# Patient Record
Sex: MALE | Race: WHITE | NOT HISPANIC OR LATINO | Employment: OTHER | URBAN - METROPOLITAN AREA
[De-identification: names, ages, dates, MRNs, and addresses within clinical notes are randomized per-mention and may not be internally consistent; named-entity substitution may affect disease eponyms.]

---

## 2024-01-08 ENCOUNTER — HOSPITAL ENCOUNTER (EMERGENCY)
Facility: CLINIC | Age: 69
Discharge: HOME | End: 2024-01-08
Attending: SOCIAL WORKER | Admitting: SOCIAL WORKER

## 2024-01-08 ENCOUNTER — APPOINTMENT (OUTPATIENT)
Dept: ULTRASOUND IMAGING | Facility: CLINIC | Age: 69
End: 2024-01-08
Attending: EMERGENCY MEDICINE

## 2024-01-08 VITALS
TEMPERATURE: 97.6 F | WEIGHT: 205 LBS | HEART RATE: 72 BPM | OXYGEN SATURATION: 99 % | SYSTOLIC BLOOD PRESSURE: 124 MMHG | DIASTOLIC BLOOD PRESSURE: 91 MMHG | HEIGHT: 73 IN | RESPIRATION RATE: 18 BRPM | BODY MASS INDEX: 27.17 KG/M2

## 2024-01-08 DIAGNOSIS — I82.412 ACUTE DEEP VEIN THROMBOSIS (DVT) OF FEMORAL VEIN OF LEFT LOWER EXTREMITY (H): ICD-10-CM

## 2024-01-08 LAB
ANION GAP SERPL CALCULATED.3IONS-SCNC: 10 MMOL/L (ref 7–15)
BASOPHILS # BLD AUTO: 0.1 10E3/UL (ref 0–0.2)
BASOPHILS NFR BLD AUTO: 1 %
BUN SERPL-MCNC: 20.7 MG/DL (ref 8–23)
CALCIUM SERPL-MCNC: 9.2 MG/DL (ref 8.8–10.2)
CHLORIDE SERPL-SCNC: 104 MMOL/L (ref 98–107)
CREAT SERPL-MCNC: 1.07 MG/DL (ref 0.67–1.17)
DEPRECATED HCO3 PLAS-SCNC: 27 MMOL/L (ref 22–29)
EGFRCR SERPLBLD CKD-EPI 2021: 75 ML/MIN/1.73M2
EOSINOPHIL # BLD AUTO: 0.2 10E3/UL (ref 0–0.7)
EOSINOPHIL NFR BLD AUTO: 3 %
ERYTHROCYTE [DISTWIDTH] IN BLOOD BY AUTOMATED COUNT: 12.5 % (ref 10–15)
GLUCOSE SERPL-MCNC: 94 MG/DL (ref 70–99)
HCT VFR BLD AUTO: 43.4 % (ref 40–53)
HGB BLD-MCNC: 14.4 G/DL (ref 13.3–17.7)
HOLD SPECIMEN: NORMAL
HOLD SPECIMEN: NORMAL
IMM GRANULOCYTES # BLD: 0.1 10E3/UL
IMM GRANULOCYTES NFR BLD: 1 %
LYMPHOCYTES # BLD AUTO: 1.7 10E3/UL (ref 0.8–5.3)
LYMPHOCYTES NFR BLD AUTO: 24 %
MCH RBC QN AUTO: 30.2 PG (ref 26.5–33)
MCHC RBC AUTO-ENTMCNC: 33.2 G/DL (ref 31.5–36.5)
MCV RBC AUTO: 91 FL (ref 78–100)
MONOCYTES # BLD AUTO: 0.6 10E3/UL (ref 0–1.3)
MONOCYTES NFR BLD AUTO: 9 %
NEUTROPHILS # BLD AUTO: 4.5 10E3/UL (ref 1.6–8.3)
NEUTROPHILS NFR BLD AUTO: 62 %
NRBC # BLD AUTO: 0 10E3/UL
NRBC BLD AUTO-RTO: 0 /100
PLATELET # BLD AUTO: 247 10E3/UL (ref 150–450)
POTASSIUM SERPL-SCNC: 4.3 MMOL/L (ref 3.4–5.3)
RBC # BLD AUTO: 4.77 10E6/UL (ref 4.4–5.9)
SODIUM SERPL-SCNC: 141 MMOL/L (ref 135–145)
WBC # BLD AUTO: 7 10E3/UL (ref 4–11)

## 2024-01-08 PROCEDURE — 85025 COMPLETE CBC W/AUTO DIFF WBC: CPT | Performed by: SOCIAL WORKER

## 2024-01-08 PROCEDURE — 250N000013 HC RX MED GY IP 250 OP 250 PS 637: Performed by: SOCIAL WORKER

## 2024-01-08 PROCEDURE — 80048 BASIC METABOLIC PNL TOTAL CA: CPT | Performed by: SOCIAL WORKER

## 2024-01-08 PROCEDURE — 93971 EXTREMITY STUDY: CPT | Mod: LT

## 2024-01-08 PROCEDURE — 99284 EMERGENCY DEPT VISIT MOD MDM: CPT | Mod: 25

## 2024-01-08 PROCEDURE — 36415 COLL VENOUS BLD VENIPUNCTURE: CPT | Performed by: SOCIAL WORKER

## 2024-01-08 RX ORDER — APIXABAN 5 MG (74)
KIT ORAL
Qty: 74 EACH | Refills: 0 | Status: SHIPPED | OUTPATIENT
Start: 2024-01-08 | End: 2024-02-07

## 2024-01-08 RX ADMIN — APIXABAN 10 MG: 5 TABLET, FILM COATED ORAL at 17:49

## 2024-01-08 ASSESSMENT — ACTIVITIES OF DAILY LIVING (ADL)
ADLS_ACUITY_SCORE: 35
ADLS_ACUITY_SCORE: 35

## 2024-01-08 NOTE — ED NOTES
PIT/Triage Evaluation    Patient presented with left lower extremity swelling and pain with ambulation.  Present for several days.  No history of DVT.    Exam is notable for:  General:  Alert, interactive  Cardiovascular:  Well perfused  Lungs:  No respiratory distress, no accessory muscle use  Neuro:  Moving all 4 extremities  Skin:  Warm, dry  Psych:  Normal affect   Musculoskeletal: Edema to the left lower extremity    Appropriate interventions for symptom management were initiated if applicable.  Appropriate diagnostic tests were initiated if indicated.    Important information for subsequent clinician:  Ultrasound left lower extremity ordered concern for DVT.    I briefly evaluated the patient and developed an initial plan of care. I discussed this plan and explained that this brief interaction does not constitute a full evaluation. Patient/family understands that they should wait to be fully evaluated and discuss any test results with another clinician prior to leaving the hospital.       Timo Byrne MD  01/08/24 [id number]

## 2024-01-08 NOTE — DISCHARGE INSTRUCTIONS
You was to the emergency department for left leg swelling.  Your ultrasound here showed that you have a blood clot in your leg.  Otherwise, your exam was overall reassuring and we do not see signs of acute emergency at this time.  We discussed admission to the hospital and getting started on IV blood thinner with the surgery team to see you tomorrow as sometimes the surgeons will remove this blood clot completely.  At this time, your preference is to go home.  I have given you a dose of blood thinner here, please  your prescription at the pharmacy so that you can start taking it.    Please follow-up with your primary care doctor.  Please call the vascular medicine clinic to schedule a follow up appointment at [phone]. Ask for Dr. Frias or Dr Carvalho.     If you start to have chest pain, shortness of breath, you have coughing up blood, if you have fainting, if you notice that your leg is becoming more purple or is entirely white, if or you are having severe pain in the leg, if you notice that your leg is cool to the touch, or other concerning symptoms please come back to the emergency department immediately. Please also be careful as you will have a higher risk of bleeding with this medication.
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
## 2024-01-08 NOTE — ED TRIAGE NOTES
Pt started having LLE swelling on Saturday. Pain when walking but otherwise denies pain.      Triage Assessment (Adult)       Row Name 01/08/24 1410          Respiratory WDL    Respiratory WDL WDL        Cardiac WDL    Cardiac WDL WDL        Cognitive/Neuro/Behavioral WDL    Cognitive/Neuro/Behavioral WDL WDL

## 2024-01-23 ENCOUNTER — TELEPHONE (OUTPATIENT)
Dept: OTHER | Facility: CLINIC | Age: 69
End: 2024-01-23

## 2024-01-23 DIAGNOSIS — I82.409 DVT (DEEP VENOUS THROMBOSIS) (H): Primary | ICD-10-CM

## 2024-01-23 NOTE — TELEPHONE ENCOUNTER
Pt needs to be scheduled for non-fasting d dimer and NEW VASCULAR PATIENT consult with vascular medicine.  Will route to scheduling to coordinate an appointment this week or next.     Appt note:  Referral from ED for DVT. LLE venous US in EPIC. D dimer prior    Emmanuelle EMMANUEL, RN    [facility]  Office: [phone]  Fax: [phone]

## 2024-01-23 NOTE — TELEPHONE ENCOUNTER
[facility]    Who is the name of the provider?:  [facility] NURSE   What is the location you see this provider at/preferred location?: [place]  Person calling / Facility: Cooper Paul  Phone number:  [phone]  Nurse call back needed:  ?     Reason for call:  FYI 1/8  ER visit. Frias and Godishala recommended in discharge advice. Please advise on needed scheduling.     Pharmacy location:  [facility]/PHARMACY [address] [place] [address]  Outside Imaging: n/a   Can we leave a detailed message on this number?  YES     1/23/2024, 4:03 PM

## 2024-01-24 NOTE — TELEPHONE ENCOUNTER
Future Appointments   Date Time Provider Department Center   1/31/2024  7:30 AM UP LAB [facility] UP   2/1/2024  3:00 PM Allie Carvalho MD [facility]   2/20/2024  2:30 PM [facility] [employer] [facility]

## 2024-01-24 NOTE — TELEPHONE ENCOUNTER
Left voicemail with instructions for patient to call back to schedule their appointment(s)    January 24, 2024 , 9:05 AM

## 2024-01-31 ENCOUNTER — LAB (OUTPATIENT)
Dept: LAB | Facility: CLINIC | Age: 69
End: 2024-01-31

## 2024-01-31 DIAGNOSIS — I82.409 DVT (DEEP VENOUS THROMBOSIS) (H): ICD-10-CM

## 2024-01-31 LAB — D DIMER PPP FEU-MCNC: 1.53 UG/ML FEU (ref 0–0.5)

## 2024-01-31 PROCEDURE — 36415 COLL VENOUS BLD VENIPUNCTURE: CPT

## 2024-01-31 PROCEDURE — 85379 FIBRIN DEGRADATION QUANT: CPT

## 2024-02-01 ENCOUNTER — OFFICE VISIT (OUTPATIENT)
Dept: OTHER | Facility: CLINIC | Age: 69
End: 2024-02-01
Attending: INTERNAL MEDICINE

## 2024-02-01 VITALS
SYSTOLIC BLOOD PRESSURE: 137 MMHG | HEART RATE: 75 BPM | DIASTOLIC BLOOD PRESSURE: 81 MMHG | WEIGHT: 213.4 LBS | BODY MASS INDEX: 28.15 KG/M2 | OXYGEN SATURATION: 98 %

## 2024-02-01 DIAGNOSIS — I87.2 VENOUS STASIS DERMATITIS OF BOTH LOWER EXTREMITIES: ICD-10-CM

## 2024-02-01 DIAGNOSIS — I82.412 ACUTE DEEP VEIN THROMBOSIS (DVT) OF FEMORAL VEIN OF LEFT LOWER EXTREMITY (H): Primary | ICD-10-CM

## 2024-02-01 DIAGNOSIS — I82.890 SUPERFICIAL VEIN THROMBOSIS: ICD-10-CM

## 2024-02-01 PROCEDURE — 99204 OFFICE O/P NEW MOD 45 MIN: CPT | Performed by: INTERNAL MEDICINE

## 2024-02-01 PROCEDURE — 99213 OFFICE O/P EST LOW 20 MIN: CPT | Performed by: INTERNAL MEDICINE

## 2024-02-01 NOTE — PATIENT INSTRUCTIONS
Please continue eliquis 5 mg twice a day for 6 months total , New Rx     Use compression stockings 20-30 mm hg thigh high during day time and elevate leg DME Rx given   Purchase on amazon ( look for Sigvaris or Medi or Jobst brand)     Establish care with primary and go for age and gender appropriate cancer screening     Follow up with me in 5 months

## 2024-02-01 NOTE — PROGRESS NOTES
[facility] INITIAL VASCULAR MEDICINE CONSULT    ( New patient Visit)     PRIMARY HEALTH CARE PROVIDER:  No Ref-Primary, Physician      REFERRING HEALTH CARE PROVIDER;  No ref. provider found      REASON FOR CONSULT: Evaluation and management of recently diagnosed extensive left lower extremity DVT initiated Eliquis      HPI: Roderick Paul is a 68 year old very pleasant male with no significant past medical history developed left lower extremity swelling seen in the ER ultrasound revealed extensive DVT starting from common femoral vein to ankle area both deep and superficial thrombosis and initiated Eliquis starter pack and he has no insurance his kidney function was normal CBC was normal.  He currently has no primary care physician.  10 years ago colonoscopy was normal.  No personal history of malignancy.  No recent travel.  No recent COVID infection or COVID vaccination.  He denies any chest pain, shortness of breath or palpitations    He accompanied by his wife today for this visit      PAST MEDICAL HISTORY  Past Medical History:   Diagnosis Date    Chronic kidney disease     DVT (deep venous thrombosis) (H) LLE 1/2024     Kidney stone        CURRENT MEDICATIONS  Apixaban Starter Pack (ELIQUIS DVT/PE STARTER PACK) 5 MG TBPK, Take 10 mg by mouth 2 times daily for 7 days, THEN 5 mg 2 times daily for 23 days.    No current facility-administered medications on file prior to visit.      PAST SURGICAL HISTORY:  Past Surgical History:   Procedure Laterality Date    CHOLECYSTECTOMY         ALLERGIES   No Known Allergies    FAMILY HISTORY  Family History   Adopted: Yes   Family history unknown: Yes       VASCULAR FAMILY HISTORY  1st order relative with atherosclerotic PAD: No  1st order relative with AAA: No  Family history of Familial Hyperlipidemia No  Family History of Hypercoagulable state:No    VASCULAR RISK FACTORS  1. Diabetes:No   2. Smoking: does not smoke  3. HTN:  normotensive  4.Hyperlipidemia: No      SOCIAL HISTORY  Social History     Socioeconomic History    Marital status:      Spouse name: Not on file    Number of children: Not on file    Years of education: Not on file    Highest education level: Not on file   Occupational History    Not on file   Tobacco Use    Smoking status: Never    Smokeless tobacco: Not on file   Substance and Sexual Activity    Alcohol use: Yes     Comment: rarely    Drug use: No    Sexual activity: Not on file   Other Topics Concern    Not on file   Social History Narrative    Not on file     Social Determinants of Health     Financial Resource Strain: Not on file   Food Insecurity: Not on file   Transportation Needs: Not on file   Physical Activity: Not on file   Stress: Not on file   Social Connections: Not on file   Interpersonal Safety: Not on file   Housing Stability: Not on file       ROS:   General: No change in weight, sleep or appetite.  Normal energy.  No fever or chills  Eyes: Negative for vision changes or eye problems  ENT: No problems with ears, nose or throat.  No difficulty swallowing.  Resp: No coughing, wheezing or shortness of breath  CV: No chest pains or palpitations  GI: No nausea, vomiting,  heartburn, abdominal pain, diarrhea, constipation or change in bowel habits  : No urinary frequency or dysuria, bladder or kidney problems  Musculoskeletal: No significant muscle or joint pains  Neurologic: No headaches, numbness, tingling, weakness, problems with balance or coordination  Psychiatric: No problems with anxiety, depression or mental health  Heme/immune/allergy: No history of bleeding or clotting problems or anemia.  No allergies or immune system problems  Endocrine: No history of thyroid disease, diabetes or other endocrine disorders  Skin: No rashes,worrisome lesions or skin problems  Vascular: Recently history of extensive left lower extremity DVT  EXAM:  /81 (BP Location: Left arm, Patient Position:  Chair, Cuff Size: Adult Regular)   Pulse 75   Wt 213 lb 6.4 oz (96.8 kg)   SpO2 98%   BMI 28.15 kg/m    In general, the patient is a pleasant male in no apparent distress.    HEENT: NC/AT.  PERRLA.  EOMI.  Sclerae white, not injected.  Nares clear.  Pharynx without erythema or exudate.  Dentition intact.    Neck: No adenopathy.  No thyromegaly. Carotids +2/2 bilaterally without bruits.  No jugular venous distension.   Heart: RRR. Normal S1, S2 splits physiologically. No murmur, rub, click, or gallop. The PMI is in the 5th ICS in the midclavicular line. There is no heave.    Lungs: CTA.  No ronchi, wheezes, rales.  No dullness to percussion.   Abdomen: Soft, nontender, nondistended. No organomegaly. No AAA.  No bruits.   Extremities: Left leg is slightly larger than right leg but well-perfused no evidence of phlegmasia  He does have a venous stasis dermatitis changes right more than left leg  Palpable symmetrical peripheral pulses      Labs:  LIPID RESULTS:  Lab Results   Component Value Date    CHOL 157 09/05/2007    HDL 28 (L) 09/05/2007    LDL 89 09/05/2007    TRIG 197 (H) 09/05/2007    CHOLHDLRATIO 5.5 (H) 09/05/2007       LIVER ENZYME RESULTS:  Lab Results   Component Value Date    AST 24 09/04/2007    ALT 37 09/04/2007       CBC RESULTS:  Lab Results   Component Value Date    WBC 7.0 01/08/2024    WBC 9.9 09/04/2007    RBC 4.77 01/08/2024    RBC 4.20 (L) 09/04/2007    HGB 14.4 01/08/2024    HGB 13.0 (L) 09/04/2007    HCT 43.4 01/08/2024    HCT 37.7 (L) 09/04/2007    MCV 91 01/08/2024    MCV 90 09/04/2007    MCH 30.2 01/08/2024    MCH 30.9 09/04/2007    MCHC 33.2 01/08/2024    MCHC 34.5 09/04/2007    RDW 12.5 01/08/2024    RDW 13.3 09/04/2007     01/08/2024     09/04/2007       BMP RESULTS:  Lab Results   Component Value Date     01/08/2024     09/04/2007    POTASSIUM 4.3 01/08/2024    POTASSIUM 4.2 09/05/2007    CHLORIDE 104 01/08/2024    CHLORIDE 121 (H) 09/04/2007    CO2 27  01/08/2024    CO2 19 (L) 09/04/2007    ANIONGAP 10 01/08/2024    ANIONGAP 4 (L) 09/04/2007    GLC 94 01/08/2024    GLC 85 09/04/2007    BUN 20.7 01/08/2024    BUN 12 09/04/2007    CR 1.07 01/08/2024    CR 0.77 (L) 09/04/2007    GFRESTIMATED 75 01/08/2024    GFRESTIMATED >90 09/04/2007    GFRESTBLACK >90 09/04/2007    MONIE 9.2 01/08/2024    MONIE 6.3 (LL) 09/04/2007        THYROID RESULTS:  Lab Results   Component Value Date    TSH 0.65 09/04/2007       Procedures:     US LOWER EXTREMITY VENOUS DUPLEX LEFT 1/8/2024 2:53 PM     CLINICAL HISTORY: Left lower extremity swelling and pain  TECHNIQUE: Venous Duplex ultrasound of the left lower extremity with  and without compression, augmentation and duplex. Color flow and  spectral Doppler with waveform analysis performed.     COMPARISON: None.     FINDINGS: Exam includes the common femoral, femoral, popliteal, and  contralateral common femoral veins as well as segmentally visualized  deep calf veins and greater saphenous vein.      LEFT: Large volume of primarily occlusive deep vein thrombosis  throughout the left lower extremity extending from the common femoral  vein through the level of the ankle. There is also occlusive  superficial thrombus in the lesser saphenous vein. No popliteal cyst.                                                                      IMPRESSION:  1.  Large volume deep vein thrombus in the left lower extremity  extending from the common femoral vein through the ankle.  2.  Occlusive superficial thrombus throughout the visualized portions  of the lesser saphenous vein.     NAHID HERNANDEZ MD          Assessment and Plan:     1. Acute deep vein thrombosis (DVT) of femoral vein  to Ankle area of left lower extremity (H)  - Compression Sleeve/Stocking Order for DME - ONLY FOR DME  - apixaban ANTICOAGULANT (ELIQUIS) 5 MG tablet; Take 1 tablet (5 mg) by mouth 2 times daily  Dispense: 60 tablet; Refill: 5    2. Superficial vein thrombosis, LLE SSV    3.  Venous stasis dermatitis of both lower extremities       This is a very pleasant 68-year-old male with first lifetime thromboembolic event , left lower extremity extensive DVT initiated Eliquis tolerating.  He has no health insurance in the process of working with the .  He accompanied by his wife today.  He is using over-the-counter compression stockings knee-high  No personal history of malignancy no family history of hypercoagulable status  This appears unprovoked DVT  Suggested patient to work with  and get the health insurance  Continue Eliquis 5 mg twice a day for total duration of 6 months then reassess, I have given a coupons to the patient  Utilize compression stockings thigh-high 20 to 30 mmHg during the daytime and elevate the legs DME prescription and information given he wanted to buy@amazon.com  No need for hypercoagulable studies  Suggest him to establish care with primary care physician    Follow-up with me in 5 months and prior to visit to get D-dimer lab test     30 minutes spent on the date of the encounter doing chart review, history and exam, documentation, and further activities as noted above.    Allie Carvalho MD,FAHA,FSVM,FNLA, FACP  Vascular Medicine  Clinical Hypertension Specialist   Clinical Lipidologist

## 2024-02-01 NOTE — PROGRESS NOTES
[facility]        Patient is here for a consult to discuss DVT/PE    Pt is currently taking Eliquis.    /81 (BP Location: Left arm, Patient Position: Chair, Cuff Size: Adult Regular)   Pulse 75   Wt 213 lb 6.4 oz (96.8 kg)   SpO2 98%   BMI 28.15 kg/m      The provider has been notified that the patient has no concerns.     Questions patient would like addressed today are: N/A.    Refills are needed: N/A    Has homecare services and agency name:  Betsey Cisneros MA

## 2024-02-08 ENCOUNTER — DOCUMENTATION ONLY (OUTPATIENT)
Dept: OTHER | Facility: CLINIC | Age: 69
End: 2024-02-08

## 2024-02-09 ENCOUNTER — TELEPHONE (OUTPATIENT)
Dept: OTHER | Facility: CLINIC | Age: 69
End: 2024-02-09

## 2024-02-09 ENCOUNTER — OFFICE VISIT (OUTPATIENT)
Dept: FAMILY MEDICINE | Facility: CLINIC | Age: 69
End: 2024-02-09

## 2024-02-09 VITALS
RESPIRATION RATE: 16 BRPM | TEMPERATURE: 97 F | WEIGHT: 220.4 LBS | HEIGHT: 73 IN | OXYGEN SATURATION: 98 % | HEART RATE: 63 BPM | DIASTOLIC BLOOD PRESSURE: 80 MMHG | SYSTOLIC BLOOD PRESSURE: 136 MMHG | BODY MASS INDEX: 29.21 KG/M2

## 2024-02-09 DIAGNOSIS — I82.412 ACUTE DEEP VEIN THROMBOSIS (DVT) OF FEMORAL VEIN OF LEFT LOWER EXTREMITY (H): Primary | ICD-10-CM

## 2024-02-09 PROCEDURE — 99204 OFFICE O/P NEW MOD 45 MIN: CPT | Performed by: STUDENT IN AN ORGANIZED HEALTH CARE EDUCATION/TRAINING PROGRAM

## 2024-02-09 NOTE — TELEPHONE ENCOUNTER
Referral received via [employer] on 02/09/24.    Pt referred to [facility] by Dr. Kristen Ryder for follow up with Dr. Clemente in 5 months for DVT.    Patient saw Dr. Carvalho 2/1/2024 and Dr. Carvalho has already established a plan detailing follow up in 5 months.  Patient will be sent a letter to remind him to schedule shortly before expected date.    Chetna Gray RN BSN  [facility]  Phone: [phone]  Fax: [phone]

## 2024-02-09 NOTE — PROGRESS NOTES
"  Assessment & Plan     (I82.412) Acute deep vein thrombosis (DVT) of femoral vein of left lower extremity (H)  (primary encounter diagnosis)  Comment: Acute, stable.  Patient compliant with taking Eliquis.  Was given a starter pack and does have a 1 month prescription of Eliquis.  Due to financial burden concern patient and wife are hoping for assistance with obtaining remainder of the prescription for the next 5 months.  Did place a e[employer]comt message to [facility] in the prescription program and did encourage patient to follow-up with pharmacy downstairs regarding next steps of the prescription assistance program.  Will also place referral for vascular for patient to follow-up with the team in 5 months as recommended.  Plan: Vascular Surgery Referral          I personally spent a total of  45  minutes on the day of the visit, excluding procedures. Please see note for details about time spent which includes:      pre-visit preparation    reviewing records    face to face time with the patient    timely documentation of the encounter    ordering medications/tests    communication with care team    care coordination.    Dictation Disclaimer: Some of this Note has been completed with voice-recognition dictation software. Although errors are generally corrected real-time, there is the potential for a rare error to be present in the completed chart.             MED REC REQUIRED  Post Medication Reconciliation Status:  Discharge medications reconciled, continue medications without change  BMI  Estimated body mass index is 29.08 kg/m  as calculated from the following:    Height as of this encounter: 1.854 m (6' 1\").    Weight as of this encounter: 100 kg (220 lb 6.4 oz).             Lali Vaughn is a 68 year old, presenting for the following health issues:  ED/FU        2/9/2024     7:04 AM   Additional Questions   Roomed by Rebecca   Accompanied by spouse     HPI       ED/UC Followup:    Facility:   [facility]" "  Date of visit: 1/8/24  Reason for visit: Acute deep vein thrombosis (DVT) of femoral vein of left lower extremity (H)   Current Status: Improved, stable    Patient reports that he woke up after couple days and started noticing pain in his left lower calf.  He states that he had never experienced this pain before and it lasted for several days.  He endorses that with standing was more pain and with sitting alleviated the pain.  Wife states that patient's calf and leg became extremely swollen with an inability to place inside jeans and the skin appeared very tight.  Due to concerns of worsening conditions therefore going into the hospital for evaluation.  While in the hospital he reports being told that he had a deep vein thrombosis.  Prior to discharge he reports seeing vascular surgery with Dr. Jones and was encouraged to follow-up in 5 months.  Wife states that there have been issues with obtaining the Eliquis due to cost and is hoping for next steps on prescription assistance.  Overall patient states that he is feeling a lot better that he does have any pain with movement and hopes that this does not happen again.  He does endorse a history of being adopted and being born in [place] and is unaware of his family history or lineage.           ROS: 10 point ROS neg other than the symptoms noted above in the HPI.        Objective    /80   Pulse 63   Temp 97  F (36.1  C) (Temporal)   Resp 16   Ht 1.854 m (6' 1\")   Wt 100 kg (220 lb 6.4 oz)   SpO2 98%   BMI 29.08 kg/m    Body mass index is 29.08 kg/m .  Physical Exam   GENERAL: healthy, alert and no distress  EYES: Eyes grossly normal to inspection, PERRL and conjunctivae and sclerae normal  Neck: No visible JVD or lymphadenopathy   RESP: symmetrical rise in chest   CV: No peripheral edema notable   MS: no gross musculoskeletal defects noted  SKIN: no suspicious lesions or rashes  PSYCH: mentation appears normal, affect normal/bright          "     Signed Electronically by: MAURY BETHEA MD

## 2024-02-09 NOTE — COMMUNITY RESOURCES LIST (ENGLISH)
02/09/2024   [facility]  N/A  For questions about this resource list or additional care needs, please contact your primary care clinic or care manager.  Phone: [phone]   Email: N/A   Address: [address] [place] [id number]   Hours: N/A        Financial Stability       Rent and mortgage payment assistance  1  [facility] Distance: 0.76 miles      In-Person, Phone/Virtual   [address] [place] [address]  Language: English  Hours: Mon - Fri 8:00 AM - 4:30 PM  Fees: Free   Phone: (528) 287-5655 Email: servicecenterinfo@[facility] Website: https://www.[place]./[facility]/facilities/service-center-info     2  [facility] - Rent Assistance Distance: 1.47 miles      In-Person   [address] [place] [address]  Language: English  Hours: Mon - Fri 9:00 AM - 5:00 PM  Fees: Free   Phone: (832) 115-7497 Email: info@naif.org Website: http://www.naif.org/          Food and Nutrition       Food pantry  3  [facility] - Human Services - [facility] Food Shelf Distance: 0.32 miles      In-Person, Pickup   [address] [place] [address]  Language: Japanese, English, Irish, Irish Creole, Hmong, Mandarin, Oromo, Burkinan, Citizen of [place]  Hours: Mon - Thu 10:00 AM - 4:00 PM  Fees: Free   Phone: (960) 843-7898 Website: http://www.[facility] Center[facility]/human-services     4  [facility] Distance: 0.43 miles      In-Person   [address] [place] [address]  Language: English  Hours: Sat 11:00 AM - 2:00 PM  Fees: Free   Phone: (259) 689-1595 Email: bhavin@[employer] Website: https://www.[facility].org/     SNAP application assistance  5  [facility] Distance: 0.76 miles      In-Person, Phone/Virtual   [address] [place] [address]  Language:  English  Hours: Mon - Fri 8:00 AM - 4:30 PM  Fees: Free   Phone: (591) 516-9594 Email: servicecentdiana@[place]. Website: https://www.[place]./[facility]/facilities/service-center-info     6  [facility] Distance: 0.88 miles      In-Person, Phone/Virtual   [address] [place] [address]  Language: English, Marcel, Mozambican  Hours: Mon - Fri 9:00 AM - 5:00 PM  Fees: Free   Phone: (791) 571-1396 Email: kelle@[place] Website: https://www.[facility].com/[employer]/     Soup kitchen or free meals  7  [facility] Distance: 0.28 miles      Pickup   [address] [place] [address]  Language: English, Romanian  Hours: Tue - Thu 4:00 PM - 6:00 PM  Fees: Free   Phone: (131) 979-2331 Email: communications@[facility] Website: https://www.[facility]     8  [facility] Distance: 0.74 miles      In-Person, Pickup   [address] [place] [address]  Language: English  Hours: Mon - Fri 3:00 PM - 9:00 PM , Sat 9:00 AM - 4:00 PM  Fees: Free   Phone: (254) 820-9734 Email: srinivas@[facility] Website: https://www.[employer].org/parks__destinations/recreation_centers/[facility]_recreation_center/          Transportation       Free or low-cost transportation  9  [facility] - Bus Passes - Free or low-cost transportation Distance: 1.47 miles      In-Person   [address] [place] [address]  Language: English  Hours: Tue 9:30 AM - 11:30 AM , Thu 9:30 AM - 11:30 AM  Fees: Free   Phone: (613) 692-9694 Email: info@[employer] Website: http://www.[employer]if.[employer]/     10  [facility] Distance: 2.06 miles      In-Person   [address] [place] [address]  Language: English  Hours: Mon - Wed 9:30 AM - 12:00 PM , Mon - Wed 1:00 PM - 2:00 PM Appt. Only  Fees: Free   Phone: (238) 438-7818  Email: info@[employer].org Website: http://www.[facility].org/     Transportation to medical appointments  11  [facility] Distance: 5.57 miles      In-Person   [address] [place] [address]  Language: English  Hours: Mon - Fri 9:00 AM - 5:00 PM  Fees: Self Pay   Phone: (654) 776-7676 Email: charley@[employer] Website: https://www.[employer]/     12  [facility]   Family Wellness ([employer]IF[employer]) Distance: 6.1 miles      In-Person   [address] [place] [address]  Language: Wolof, Spanish, English, Gujarati, Estevan, Turkmen, English, Danish, Tamazight, Bulgarian  Hours: Mon - Wed 9:00 AM - 5:00 PM , Thu 12:00 PM - 6:00 PM , Fri 9:00 AM - 5:00 PM , Sun 10:30 AM - 2:00 PM Appt. Only  Fees: Free   Phone: (285) 313-4561 Email: info@[facility].org Website: https://www.sewa-aifw.org/          Important Numbers & Websites       Emergency Services   911  City Services   311  Poison Control   (656) 194-4493  Suicide Prevention Lifeline   (288) 671-4086 (TALK)  Child Abuse Hotline   (957) 988-1709 (4-A-Child)  Sexual Assault Hotline   (618) 150-6851 (HOPE)  National Runaway Safeline   (906) 339-7524 (RUNAWAY)  All-Options Talkline   (728) 280-8032  Substance Abuse Referral   (631) 655-8411 (HELP)

## 2024-02-09 NOTE — PROGRESS NOTES
Financial Assistance forms received for Eliquis.    Placed in Dr. Carvalho's inbox for signature.  
Forms signed by Dr. Carvalho, faxed to [phone].    [employer] 02/09/24 10:38.    Sent to HIM.  
oral

## 2024-03-12 ENCOUNTER — TELEPHONE (OUTPATIENT)
Dept: FAMILY MEDICINE | Facility: CLINIC | Age: 69
End: 2024-03-12

## 2024-03-12 NOTE — TELEPHONE ENCOUNTER
Roderick had applied to the Pharmacy Assistance fund [place] $500.    Based on his history with [facility] and the income information provided, he is not well established with [facility] and is well over income for the program.    Perla Willett  Prescription Assistance Supervisor  Pharmacy Assistance

## 2024-07-05 ENCOUNTER — TELEPHONE (OUTPATIENT)
Dept: FAMILY MEDICINE | Facility: CLINIC | Age: 69
End: 2024-07-05

## 2024-07-05 NOTE — LETTER
July 5, 2024    To  Roderick Paul  [address]  [place] [address]        Your team at [facility] cares about your health. We have reviewed your chart and based on our findings; we are making the following recommendations to better manage your health.     You are in particular need of attention regarding the following:     Call or MyChart message your clinic to schedule a colonoscopy, schedule/ a FIT Test, or order a Cologuard test. If you are unsure what type of test you need, please call your clinic and speak to clinic staff.   Colon cancer is now the second leading cause of cancer-related deaths in the [place] for both men and women and there are over 130,000 new cases and 50,000 deaths per year from colon cancer. Colonoscopies can prevent 90-95% of these deaths. Problem lesions can be removed before they ever become cancer. This test is not only looking for cancer, but also getting rid of precancerous lesions.   If you are under/uninsured, we recommend you contact the [employer] Program.[employer] is a free colorectal cancer screening program that provides colonoscopies for eligible under/uninsured [place] men and women. If you are interested in receiving a free colonoscopy, please call [employer] at t [phone] (mention code [employer]) to see if you're eligible. Please have them send us the results.   PREVENTATIVE VISIT: Annual Medicare Wellness:Schedule an Annual Medicare Wellness Exam. Please call your [facility] clinic to set up your appointment.    If you have already completed these items, please contact the clinic via phone or   MyChart so your care team can review and update your records. Thank you for   choosing [facility]ics for your healthcare needs. For any questions,   concerns, or to schedule an appointment please contact our clinic.    Healthy Regards,      Your [facility] Care Team

## 2024-07-05 NOTE — TELEPHONE ENCOUNTER
Patient Quality Outreach    Patient is due for the following:   Colon Cancer Screening  Physical Annual Wellness Visit      Topic Date Due    Zoster (Shingles) Vaccine (1 of 2) Never done    Pneumococcal Vaccine (1 of 1 - PCV) Never done    Diptheria Tetanus Pertussis (DTAP/TDAP/TD) Vaccine (3 - Td or Tdap) 10/28/2019    COVID-19 Vaccine (3 - 2023-24 season) 09/01/2023       Next Steps:   Schedule a Annual Wellness Visit    Type of outreach:    Sent letter.    Next Steps:  Reach out within 90 days via Phone.    Max number of attempts reached: No. Will try again in 90 days if patient still on fail list.    Questions for provider review:    None           Chelita Odell MA